# Patient Record
Sex: FEMALE | Race: WHITE
[De-identification: names, ages, dates, MRNs, and addresses within clinical notes are randomized per-mention and may not be internally consistent; named-entity substitution may affect disease eponyms.]

---

## 2021-08-22 ENCOUNTER — NURSE TRIAGE (OUTPATIENT)
Dept: OTHER | Facility: CLINIC | Age: 36
End: 2021-08-22

## 2021-08-22 NOTE — TELEPHONE ENCOUNTER
Reason for Disposition   Nursing judgment or information in reference    Answer Assessment - Initial Assessment Questions  1. REASON FOR CALL: \"What is your main concern right now? \"      Just came out of 7 day Detox and still have diarrhea, shaking and other symptoms    2. ONSET: \"When did the ___ start? \"      When I got out    3. SEVERITY: \"How bad is the ___? \"      Bad    4. FEVER: \"Do you have a fever? \"      No    5. OTHER SYMPTOMS: \"Do you have any other new symptoms? \"      Same as above    6. INTERVENTIONS AND RESPONSE: \"What have you done so far to try to make this better? What medications have you used? \"      Nothing, not sure what she can use    7. PREGNANCY: \"Is there any chance you are pregnant? \"    Protocols used: NO GUIDELINE AVAILABLE - SICK ADULT CALL-ADULT-    Brief description of triage: Garrett Knox just got out of detox and is unsure what medications she can take and wanted to go to ED and get some medicine to take. Caller is having diarrhea and shakes, nausea. Triage indicates for patient to Call PCP Now. If unable to reach should go to [facility]. Care advice provided, patient verbalizes understanding; denies any other questions or concerns; instructed to call back for any new or worsening symptoms. This triage is a result of a call to [address] [place] [address]. Please do not respond to the triage nurse through this encounter. Any subsequent communication should be directly with the patient.